# Patient Record
Sex: MALE | Race: WHITE | NOT HISPANIC OR LATINO | Employment: UNEMPLOYED | URBAN - METROPOLITAN AREA
[De-identification: names, ages, dates, MRNs, and addresses within clinical notes are randomized per-mention and may not be internally consistent; named-entity substitution may affect disease eponyms.]

---

## 2023-04-23 PROBLEM — S93.602A FOOT SPRAIN, LEFT, INITIAL ENCOUNTER: Status: ACTIVE | Noted: 2023-04-23

## 2023-04-24 ENCOUNTER — OFFICE VISIT (OUTPATIENT)
Dept: PODIATRY | Facility: CLINIC | Age: 33
End: 2023-04-24
Payer: MEDICAID

## 2023-04-24 VITALS
HEIGHT: 75 IN | WEIGHT: 315 LBS | DIASTOLIC BLOOD PRESSURE: 105 MMHG | SYSTOLIC BLOOD PRESSURE: 157 MMHG | HEART RATE: 76 BPM | BODY MASS INDEX: 39.17 KG/M2

## 2023-04-24 DIAGNOSIS — S93.602A FOOT SPRAIN, LEFT, INITIAL ENCOUNTER: ICD-10-CM

## 2023-04-24 DIAGNOSIS — M79.605 PAIN AND SWELLING OF LOWER EXTREMITY, LEFT: Primary | ICD-10-CM
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
## 2023-04-24 DIAGNOSIS — S96.912A STRAIN OF TENDON OF LEFT FOOT AND ANKLE, INITIAL ENCOUNTER: ICD-10-CM

## 2023-04-24 DIAGNOSIS — E66.01 CLASS 3 SEVERE OBESITY WITHOUT SERIOUS COMORBIDITY WITH BODY MASS INDEX (BMI) OF 40.0 TO 44.9 IN ADULT, UNSPECIFIED OBESITY TYPE: ICD-10-CM
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
## 2023-04-24 DIAGNOSIS — M79.89 PAIN AND SWELLING OF LOWER EXTREMITY, LEFT: Primary | ICD-10-CM

## 2023-04-24 PROCEDURE — 99999 PR PBB SHADOW E&M-EST. PATIENT-LVL III: ICD-10-PCS | Mod: PBBFAC,,, | Performed by: PODIATRIST

## 2023-04-24 PROCEDURE — 3008F PR BODY MASS INDEX (BMI) DOCUMENTED: ICD-10-PCS | Mod: CPTII,,, | Performed by: PODIATRIST

## 2023-04-24 PROCEDURE — 99999 PR PBB SHADOW E&M-EST. PATIENT-LVL III: CPT | Mod: PBBFAC,,, | Performed by: PODIATRIST

## 2023-04-24 PROCEDURE — 99203 PR OFFICE/OUTPT VISIT, NEW, LEVL III, 30-44 MIN: ICD-10-PCS | Mod: S$PBB,,, | Performed by: PODIATRIST

## 2023-04-24 PROCEDURE — 1159F PR MEDICATION LIST DOCUMENTED IN MEDICAL RECORD: ICD-10-PCS | Mod: CPTII,,, | Performed by: PODIATRIST

## 2023-04-24 PROCEDURE — 3077F SYST BP >= 140 MM HG: CPT | Mod: CPTII,,, | Performed by: PODIATRIST

## 2023-04-24 PROCEDURE — 99203 OFFICE O/P NEW LOW 30 MIN: CPT | Mod: S$PBB,,, | Performed by: PODIATRIST

## 2023-04-24 PROCEDURE — 3077F PR MOST RECENT SYSTOLIC BLOOD PRESSURE >= 140 MM HG: ICD-10-PCS | Mod: CPTII,,, | Performed by: PODIATRIST

## 2023-04-24 PROCEDURE — 1159F MED LIST DOCD IN RCRD: CPT | Mod: CPTII,,, | Performed by: PODIATRIST

## 2023-04-24 PROCEDURE — 3008F BODY MASS INDEX DOCD: CPT | Mod: CPTII,,, | Performed by: PODIATRIST

## 2023-04-24 PROCEDURE — 3080F PR MOST RECENT DIASTOLIC BLOOD PRESSURE >= 90 MM HG: ICD-10-PCS | Mod: CPTII,,, | Performed by: PODIATRIST

## 2023-04-24 PROCEDURE — 99213 OFFICE O/P EST LOW 20 MIN: CPT | Mod: PBBFAC,PN | Performed by: PODIATRIST

## 2023-04-24 PROCEDURE — 3080F DIAST BP >= 90 MM HG: CPT | Mod: CPTII,,, | Performed by: PODIATRIST

## 2023-04-24 NOTE — PROGRESS NOTES
Subjective:      Patient ID: Samir Navarrete is a 33 y.o. male.    Chief Complaint: No chief complaint on file.    Samir is a 33 y.o. male who presents new as an add-on upon referral fro ED, to the podiatry clinic  with complaint of  left foot pain. Onset of the symptoms was day before yesterday. Fell out back of truck & landed w/ L foot inverted, while wearing a cloth running shoe. Applied ice & elevated. No relief. Pain w/ ambulation & swelling dorsal foot.  Seen in ED.  X-rays negative.  Ace wrap applied.  Rx IB written & Toradol administered. Surgical shoe dispensed.   States he stepped in a hole in his surgical shoe getting out of his car today as well.    Referral: Tierra Sandoval NP  Primary Doctor No    Past Medical History:   Diagnosis Date    Psoriasis      Patient Active Problem List   Diagnosis    Foot sprain, left, initial encounter      Objective:      X-Ray Foot Complete Left  Narrative: EXAMINATION:  XR FOOT COMPLETE 3 VIEW LEFT    CLINICAL HISTORY:  .  Injury, unspecified, initial encounter    TECHNIQUE:  AP, lateral and oblique views of the left foot were performed.    COMPARISON:  None.    FINDINGS:  Osseous mineralization is preserved.  No acute displaced fractures.  No suspicious lytic or blastic lesions.  Tarsometatarsal alignment is congruent noting limited nonweightbearing films.  No subluxation or dislocation.  Distal Achilles enthesophyte.  Subcutaneous edema about the dorsum of the foot.  Impression: 1. No acute displaced fractures.  2. Subcutaneous edema along the dorsum of the foot.    Electronically signed by: Hema Baker MD  Date:    04/23/2023  Time:    12:34   I independently reviewed the x-ray images.  Weight-bearing lateral views not taken to evaluate midfoot.  However, no sign of acute bone pathology. Slightly enlarged navicular.    Review of Systems   Constitutional: Negative for malaise/fatigue.   Cardiovascular:  Negative for claudication and leg swelling.   Skin:  Negative for  color change, dry skin, poor wound healing and suspicious lesions.   Musculoskeletal:  Positive for falls and myalgias. Negative for joint pain, joint swelling and muscle weakness.   Neurological:  Negative for focal weakness, loss of balance, numbness, paresthesias and weakness.   Psychiatric/Behavioral:  The patient is not nervous/anxious.    Physical Exam  Vitals reviewed.   Constitutional:       General: He is not in acute distress.     Appearance: He is well-developed. He is morbidly obese.   Cardiovascular:      Pulses: Normal pulses.           Dorsalis pedis pulses are 2+ on the left side.   Musculoskeletal:         General: Swelling, tenderness and signs of injury present.      Right lower leg: No edema.      Left lower leg: No edema.      Left foot: Normal range of motion. No deformity.        Feet:    Feet:      Left foot:      Skin integrity: Skin integrity normal. No erythema or warmth.      Comments: MS strength of extrinsics to foot and ankle B/L + 5/5 in DF/PF/Inv/Ev to resistance with no reproduction of pain in any direction including course of PT tendon L, nor w/ testing to resistance.  Mild tenderness across medfoot @ insertion of tibialis anterior & posterior to navicular  w/ localized edema.    Passive ROM of ankle and pedal joints is painless and without crepitation L.  No pain nor instability noted w/ ROM midfoot, STJ nor AJ L.  No TTP ATFL nor CFL L.  TTP dordomedial navicular tuberosity L       Skin:     General: Skin is warm and dry.      Capillary Refill: Capillary refill takes less than 2 seconds.      Findings: No bruising or erythema.      Comments: Edema dorso medial midfoot only.   Neurological:      Mental Status: He is alert and oriented to person, place, and time.      Sensory: No sensory deficit.      Motor: No weakness.      Gait: Gait abnormal (surgical shoe).   Psychiatric:         Mood and Affect: Mood and affect normal.         Speech: Speech normal.         Behavior: Behavior  is cooperative.       Assessment:      Encounter Diagnoses   Name Primary?    Foot sprain, left, initial encounter     Fall from one level to another, initial encounter     Accidental fall into hole or opening in surface, initial encounter     Class 3 severe obesity without serious comorbidity with body mass index (BMI) of 40.0 to 44.9 in adult, unspecified obesity type     Pain and swelling of lower extremity, left Yes    Strain of tendon of left foot and ankle, initial encounter        Problem List Items Addressed This Visit          Other    Foot sprain, left, initial encounter     Other Visit Diagnoses       Pain and swelling of lower extremity, left    -  Primary    Relevant Orders    IMMOBILIZER FOR HOME USE    Fall from one level to another, initial encounter        Accidental fall into hole or opening in surface, initial encounter        Class 3 severe obesity without serious comorbidity with body mass index (BMI) of 40.0 to 44.9 in adult, unspecified obesity type        Strain of tendon of left foot and ankle, initial encounter        Relevant Orders    IMMOBILIZER FOR HOME USE           Plan:       Diagnoses and all orders for this visit:    Pain and swelling of lower extremity, left  -     IMMOBILIZER FOR HOME USE    Foot sprain, left, initial encounter  -     Ambulatory referral/consult to Podiatry    Fall from one level to another, initial encounter    Accidental fall into hole or opening in surface, initial encounter    Class 3 severe obesity without serious comorbidity with body mass index (BMI) of 40.0 to 44.9 in adult, unspecified obesity type    Strain of tendon of left foot and ankle, initial encounter  -     IMMOBILIZER FOR HOME USE    I counseled the patient on his conditions, their implications and medical management.    WB w/ surgical shoe.  Dispensed ASO ankle brace to be worn at all times WB including at home, as well as Silipos gel strap for MLA L  Encouraged to keep iced L foot when  seated.  Follow up will be in 4-6 weeks, sooner prn.

## 2023-05-22 ENCOUNTER — TELEPHONE (OUTPATIENT)
Dept: PODIATRY | Facility: CLINIC | Age: 33
End: 2023-05-22
Payer: MEDICAID